# Patient Record
Sex: FEMALE | Race: WHITE | NOT HISPANIC OR LATINO | Employment: STUDENT | URBAN - NONMETROPOLITAN AREA
[De-identification: names, ages, dates, MRNs, and addresses within clinical notes are randomized per-mention and may not be internally consistent; named-entity substitution may affect disease eponyms.]

---

## 2021-04-21 ENCOUNTER — OFFICE VISIT (OUTPATIENT)
Dept: ORTHOPEDIC SURGERY | Facility: CLINIC | Age: 21
End: 2021-04-21

## 2021-04-21 VITALS — TEMPERATURE: 97.4 F | WEIGHT: 140 LBS | HEIGHT: 62 IN | BODY MASS INDEX: 25.76 KG/M2

## 2021-04-21 DIAGNOSIS — M79.609 PARESTHESIA AND PAIN OF RIGHT EXTREMITY: Primary | ICD-10-CM

## 2021-04-21 DIAGNOSIS — R20.2 PARESTHESIA AND PAIN OF RIGHT EXTREMITY: Primary | ICD-10-CM

## 2021-04-21 DIAGNOSIS — M25.531 RIGHT WRIST PAIN: ICD-10-CM

## 2021-04-21 PROCEDURE — 99204 OFFICE O/P NEW MOD 45 MIN: CPT | Performed by: PHYSICIAN ASSISTANT

## 2021-04-21 RX ORDER — NORGESTIMATE AND ETHINYL ESTRADIOL 7DAYSX3 28
KIT ORAL
COMMUNITY
Start: 2021-03-22

## 2021-04-21 NOTE — PROGRESS NOTES
"Chief Complaint  Pain and Establish Care of the Right Wrist    Subjective    History of Present Illness      Maryellen Rosales is a 20 y.o. female who presents to [facility] for complaint of right wrist pain, intermittently, x 2years. 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 She reports an increase in wrist pain, as well as numbness and tingling in the 4th and 5th fingers over the last few months. She states the pain is throbbing/shooting and rated at 9/10. She has experienced swelling as well.         Objective   Vital Signs:   Temp 97.4 °F (36.3 °C)   Ht 157.5 cm (62\")   Wt 63.5 kg (140 lb)   BMI 25.61 kg/m²     Physical Exam  Vitals signs and nursing note reviewed.   Constitutional:       Appearance: Normal appearance.   Pulmonary:      Effort: Pulmonary effort is normal.   Skin:     General: Skin is warm and dry.      Capillary Refill: Capillary refill takes less than 2 seconds.   Neurological:      General: No focal deficit present.      Mental Status: He is alert and oriented to person, place, and time. Mental status is at baseline.   Psychiatric:         Mood and Affect: Mood normal.         Behavior: Behavior normal.         Thought Content: Thought content normal.         Judgment: Judgment normal.     Ortho Exam   RIGHT wrist   She is right hand dominant.   There is no obvious swelling over the wrist during exam.   Radial pulse is palpable. Capillary refill is 2 seconds with a brisk return.   Negative Tinel's sign at the wrist. Negative Phalen's sign at the wrist.  There is no muscle wasting or atrophy specifically involving the thenar muscle group.    Sensation to light touch and pinprick are diminished over the thumb, index and middle fingers.    Flexor and extensor tendon functions are well preserved.   Moving 2 point discrimination is prolonged to 12mm over the median nerve distribution. No evidence of RSD.    There is no clinical " evidence of renal disease, thyroid disease or any generalized neurological condition that could be causing nerve dysfunction.       Result Review :   Radiologic studies - see below for interpretation  Right wrist xrays 3views were performed at  on 1/28/21. These images were independently viewed and interpreted by myself, my impression as follows:  · No acute bony abnormality      Assessment   Assessment and Plan    Problem List Items Addressed This Visit        Musculoskeletal and Injuries    Paresthesia and pain of right extremity - Primary    Relevant Orders    EMG & Nerve Conduction Test    Right wrist pain          Follow Up   · Discussion of any imaging in detail. Discussion of orthopaedic goals.  · Risk, benefits, and merits of treatment alternatives reviewed with the patient. Treatment alternatives include: further imaging/testing with EMG/NCS. Also discussed doing labs to check for underlying rheumatic disease and discussed doing MRI of the wrist. She wishes to proceed with EMG at this time.   · Ice, heat, and/or modalities as beneficial  · Patient is encouraged to call or return for any issues or concerns.  · Follow up once EMG/NCS has been completed  • Patient was given instructions and counseling regarding her condition or for health maintenance advice. Please see specific information pulled into the AVS if appropriate.     New Harper PA-C   Date of Encounter: 4/21/2021   Electronically signed by New Harper PA-C, 04/21/21, 8:19 AM EDT.     EMR Dragon/Transcription disclaimer:  Much of this encounter note is an electronic transcription/translation of spoken language to printed text. The electronic translation of spoken language may permit erroneous, or at times, nonsensical words or phrases to be inadvertently transcribed; Although I have reviewed the note for such errors, some may still exist.

## 2021-04-30 ENCOUNTER — OFFICE VISIT (OUTPATIENT)
Dept: ORTHOPEDIC SURGERY | Facility: CLINIC | Age: 21
End: 2021-04-30

## 2021-04-30 DIAGNOSIS — M77.8 TENDINITIS OF RIGHT ELBOW: ICD-10-CM

## 2021-04-30 DIAGNOSIS — M79.609 PARESTHESIA AND PAIN OF RIGHT EXTREMITY: Primary | ICD-10-CM

## 2021-04-30 DIAGNOSIS — R20.2 PARESTHESIA AND PAIN OF RIGHT EXTREMITY: Primary | ICD-10-CM

## 2021-04-30 DIAGNOSIS — M25.531 RIGHT WRIST PAIN: ICD-10-CM

## 2021-04-30 PROCEDURE — 99441 PR PHYS/QHP TELEPHONE EVALUATION 5-10 MIN: CPT | Performed by: PHYSICIAN ASSISTANT

## 2021-04-30 NOTE — PROGRESS NOTES
You have chosen to receive care through a telephone visit. Do you consent to use a telephone visit for your medical care today? Yes     Chief Complaint  Follow-up and Results of the Right Wrist    Subjective    History of Present Illness      Maryellen Rosales is a 20 y.o. female who presents to [facility] via telephone visit for   Follow-up on right wrist pain and EMG/NCS results.  I initially saw her on 4/21/2021 for wrist pain that had been intermittently present for the last 2 years.  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  She describes the pain as a throbbing and shooting pain along with swelling.      Objective   Vital Signs:   There were no vitals taken for this visit.    Physical Exam  Musculoskeletal:      Comments: See detailed ortho exam from prior visit   Neurological:      Mental Status: She is alert and oriented to person, place, and time. Mental status is at baseline.   Psychiatric:         Mood and Affect: Mood normal.         Behavior: Behavior normal.         Thought Content: Thought content normal.         Judgment: Judgment normal.       Ortho Exam   RIGHT wrist   She is right hand dominant.   There is no obvious swelling over the wrist during exam.   Radial pulse is palpable. Capillary refill is 2 seconds with a brisk return.   Negative Tinel's sign at the wrist. Negative Phalen's sign at the wrist.  There is no muscle wasting or atrophy specifically involving the thenar muscle group.    Flexor and extensor tendon functions are well preserved.   No evidence of RSD.    There is no clinical evidence of renal disease, thyroid disease or any generalized neurological condition that could be causing nerve dysfunction.   The above information is historical and is used as a reference for this telephone visit.       Result Review :   Radiologic studies -  see below for interpretation  EMG/NCS performed at [facility] on 4/27/2021, summary of impression below:   · Normal study      Assessment   Assessment and Plan    Problem List Items Addressed This Visit        Musculoskeletal and Injuries    Paresthesia and pain of right extremity - Primary    Relevant Orders    Ambulatory Referral to Physical Therapy Evaluate and treat (Completed)    Right wrist pain    Relevant Orders    Ambulatory Referral to Physical Therapy Evaluate and treat (Completed)      Other Visit Diagnoses     Tendinitis of right elbow        Relevant Orders    Ambulatory Referral to Physical Therapy Evaluate and treat (Completed)          Follow Up   · Discussion of any imaging in detail. Discussion of orthopaedic goals.  · Risk, benefits, and merits of treatment alternatives reviewed with the patient. Treatment alternatives include: physical therapy  · Ice, heat, and/or modalities as beneficial  · Physical therapy referral given  · Patient is encouraged to call or return for any issues or concerns.  · Advised patient to obtain a compression sleeve or an elbow brace.  · Gave the option to follow-up in 4 to 6 weeks after starting physical therapy or to follow up as needed  • Patient was given instructions and counseling regarding her condition or for health maintenance advice. Please see specific information pulled into the AVS if appropriate.   This visit has been rescheduled as a phone visit to comply with patient safety concerns in accordance with CDC recommendations. Total time of discussion was 6 minutes.     New Harper PA-C   Date of Encounter: 4/30/2021   Electronically signed by New Harper PA-C, 04/30/21, 2:47 PM EDT.     EMR Dragon/Transcription disclaimer:  Much of this encounter note is an electronic transcription/translation of spoken language to printed text. The electronic translation of spoken language may permit erroneous, or at  times, nonsensical words or phrases to be inadvertently transcribed; Although I have reviewed the note for such errors, some may still exist.